# Patient Record
Sex: MALE | Race: WHITE | URBAN - METROPOLITAN AREA
[De-identification: names, ages, dates, MRNs, and addresses within clinical notes are randomized per-mention and may not be internally consistent; named-entity substitution may affect disease eponyms.]

---

## 2021-05-19 ENCOUNTER — OFFICE VISIT (OUTPATIENT)
Dept: URBAN - METROPOLITAN AREA CLINIC 40 | Facility: CLINIC | Age: 21
End: 2021-05-19
Payer: SELF-PAY

## 2021-05-19 ENCOUNTER — WEB ENCOUNTER (OUTPATIENT)
Dept: URBAN - METROPOLITAN AREA CLINIC 40 | Facility: CLINIC | Age: 21
End: 2021-05-19

## 2021-05-19 DIAGNOSIS — K21.9 GERD: ICD-10-CM

## 2021-05-19 DIAGNOSIS — F41.9 ANXIETY: ICD-10-CM

## 2021-05-19 PROCEDURE — 99203 OFFICE O/P NEW LOW 30 MIN: CPT | Performed by: INTERNAL MEDICINE

## 2021-05-19 RX ORDER — PANTOPRAZOLE SODIUM 40 MG/1
1 TABLET TABLET, DELAYED RELEASE ORAL ONCE A DAY
Qty: 30 | Refills: 1

## 2021-05-19 NOTE — PHYSICAL EXAM GASTROINTESTINAL
Abdomen Obese, soft, nontender, nondistended , no guarding or rigidity , no masses palpable , normal bowel sounds , Liver and Spleen , no hepatomegaly present , no hepatosplenomegaly , liver nontender , spleen not palpable

## 2021-05-19 NOTE — HPI-TODAY'S VISIT:
Mr. Michaels is a 20-year-old male who is referred to our office today for follow-up of ED visit where he had ingested spaghetti and felt like it got stuck in his esophagus.  He was tolerating his secretions and seemed to improve after administration of a GI cocktail.  It was recommended he follow-up with GI.  He has a history of GERD.  He has been taking Tums as needed which seems to help.  He is a non-smoker.  Does not use alcohol.  Denies dysphagia. Since discharge she has done well on pantoprazole 20 mg tablets once daily.  Admits he continues to have occasional globus pharynx clearing of his throat but attributes some of this to anxiety, on Klonopin and other medications that he cannot recall.  Denies dysphagia.  No vomiting.  States that he is having normal consistency stool without hematochezia or melena.  He has been taking some Advil and ibuprofen recently due to headache as well as insomnia.  His mother recently gave him some melatonin but he did not want to take this.  Overall feels that he is improved.  Non-smoker.  Does not drink alcohol.  Admits that he did not like marijuana that was given to him by someone close recently but had no adverse events with marijuana.  Good appetite weight stable.  Admits that currently he is at home, currently not working or in school as he was having severe panic attacks.  Plans to enroll in school again at a later date.

## 2021-05-20 PROBLEM — 48694002 ANXIETY: Status: ACTIVE | Noted: 2021-05-19

## 2021-06-16 ENCOUNTER — DASHBOARD ENCOUNTERS (OUTPATIENT)
Age: 21
End: 2021-06-16

## 2021-06-16 ENCOUNTER — OFFICE VISIT (OUTPATIENT)
Dept: URBAN - METROPOLITAN AREA CLINIC 40 | Facility: CLINIC | Age: 21
End: 2021-06-16

## 2021-06-16 PROBLEM — 235595009 GASTROESOPHAGEAL REFLUX DISEASE: Status: ACTIVE | Noted: 2021-05-19

## 2021-06-16 RX ORDER — PANTOPRAZOLE SODIUM 40 MG/1
1 TABLET TABLET, DELAYED RELEASE ORAL ONCE A DAY
Qty: 30 | Refills: 1 | Status: ACTIVE | COMMUNITY
